# Patient Record
Sex: MALE | Race: OTHER | HISPANIC OR LATINO | Employment: STUDENT | URBAN - METROPOLITAN AREA
[De-identification: names, ages, dates, MRNs, and addresses within clinical notes are randomized per-mention and may not be internally consistent; named-entity substitution may affect disease eponyms.]

---
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
## 2023-01-27 ENCOUNTER — HOSPITAL ENCOUNTER (EMERGENCY)
Facility: HOSPITAL | Age: 18
Discharge: HOME | End: 2023-01-27
Attending: EMERGENCY MEDICINE

## 2023-01-27 ENCOUNTER — APPOINTMENT (EMERGENCY)
Dept: RADIOLOGY | Facility: HOSPITAL | Age: 18
End: 2023-01-27

## 2023-01-27 VITALS
DIASTOLIC BLOOD PRESSURE: 63 MMHG | RESPIRATION RATE: 17 BRPM | OXYGEN SATURATION: 96 % | TEMPERATURE: 97.9 F | WEIGHT: 237.88 LBS | HEART RATE: 96 BPM | SYSTOLIC BLOOD PRESSURE: 142 MMHG

## 2023-01-27 DIAGNOSIS — R04.0 EPISTAXIS: ICD-10-CM

## 2023-01-27 DIAGNOSIS — J11.1 INFLUENZA-LIKE ILLNESS: Primary | ICD-10-CM

## 2023-01-27 DIAGNOSIS — R11.2 NAUSEA AND VOMITING: ICD-10-CM

## 2023-01-27 LAB
ALBUMIN SERPL BCP-MCNC: 3.6 G/DL (ref 3.5–5)
ALP SERPL-CCNC: 88 U/L (ref 46–484)
ALT SERPL W P-5'-P-CCNC: 26 U/L (ref 12–78)
ANION GAP SERPL CALCULATED.3IONS-SCNC: 9 MMOL/L (ref 4–13)
AST SERPL W P-5'-P-CCNC: 21 U/L (ref 5–45)
BASOPHILS # BLD AUTO: 0.05 THOUSANDS/ÂΜL (ref 0–0.1)
BASOPHILS NFR BLD AUTO: 0 % (ref 0–1)
BILIRUB SERPL-MCNC: 0.24 MG/DL (ref 0.2–1)
BUN SERPL-MCNC: 10 MG/DL (ref 5–25)
CALCIUM SERPL-MCNC: 9.1 MG/DL (ref 8.3–10.1)
CHLORIDE SERPL-SCNC: 103 MMOL/L (ref 96–108)
CO2 SERPL-SCNC: 27 MMOL/L (ref 21–32)
CREAT SERPL-MCNC: 0.91 MG/DL (ref 0.6–1.3)
EOSINOPHIL # BLD AUTO: 0.54 THOUSAND/ÂΜL (ref 0–0.61)
EOSINOPHIL NFR BLD AUTO: 4 % (ref 0–6)
ERYTHROCYTE [DISTWIDTH] IN BLOOD BY AUTOMATED COUNT: 12.7 % (ref 11.6–15.1)
FLUAV RNA RESP QL NAA+PROBE: NEGATIVE
FLUBV RNA RESP QL NAA+PROBE: NEGATIVE
GFR SERPL CREATININE-BSD FRML MDRD: 121 ML/MIN/1.73SQ M
GLUCOSE SERPL-MCNC: 106 MG/DL (ref 65–140)
HCT VFR BLD AUTO: 44 % (ref 36.5–49.3)
HGB BLD-MCNC: 14.9 G/DL (ref 12–17)
IMM GRANULOCYTES # BLD AUTO: 0.06 THOUSAND/UL (ref 0–0.2)
IMM GRANULOCYTES NFR BLD AUTO: 0 % (ref 0–2)
LIPASE SERPL-CCNC: 76 U/L (ref 73–393)
LYMPHOCYTES # BLD AUTO: 3.18 THOUSANDS/ÂΜL (ref 0.6–4.47)
LYMPHOCYTES NFR BLD AUTO: 22 % (ref 14–44)
MCH RBC QN AUTO: 29.2 PG (ref 26.8–34.3)
MCHC RBC AUTO-ENTMCNC: 33.9 G/DL (ref 31.4–37.4)
MCV RBC AUTO: 86 FL (ref 82–98)
MONOCYTES # BLD AUTO: 1.07 THOUSAND/ÂΜL (ref 0.17–1.22)
MONOCYTES NFR BLD AUTO: 7 % (ref 4–12)
NEUTROPHILS # BLD AUTO: 9.77 THOUSANDS/ÂΜL (ref 1.85–7.62)
NEUTS SEG NFR BLD AUTO: 67 % (ref 43–75)
NRBC BLD AUTO-RTO: 0 /100 WBCS
PLATELET # BLD AUTO: 338 THOUSANDS/UL (ref 149–390)
PMV BLD AUTO: 9.2 FL (ref 8.9–12.7)
POTASSIUM SERPL-SCNC: 3.8 MMOL/L (ref 3.5–5.3)
PROT SERPL-MCNC: 7.5 G/DL (ref 6.4–8.4)
RBC # BLD AUTO: 5.11 MILLION/UL (ref 3.88–5.62)
RSV RNA RESP QL NAA+PROBE: NEGATIVE
SARS-COV-2 RNA RESP QL NAA+PROBE: NEGATIVE
SODIUM SERPL-SCNC: 139 MMOL/L (ref 135–147)
WBC # BLD AUTO: 14.67 THOUSAND/UL (ref 4.31–10.16)

## 2023-01-27 RX ORDER — FAMOTIDINE 20 MG/1
20 TABLET, FILM COATED ORAL 2 TIMES DAILY
Qty: 30 TABLET | Refills: 0 | Status: SHIPPED | OUTPATIENT
Start: 2023-01-27

## 2023-01-27 RX ORDER — ACETAMINOPHEN 325 MG/1
650 TABLET ORAL EVERY 6 HOURS PRN
Qty: 40 TABLET | Refills: 0 | Status: SHIPPED | OUTPATIENT
Start: 2023-01-27

## 2023-01-27 RX ORDER — ONDANSETRON 4 MG/1
4 TABLET, ORALLY DISINTEGRATING ORAL EVERY 6 HOURS PRN
Qty: 12 TABLET | Refills: 0 | Status: SHIPPED | OUTPATIENT
Start: 2023-01-27

## 2023-01-27 RX ORDER — ONDANSETRON 2 MG/ML
4 INJECTION INTRAMUSCULAR; INTRAVENOUS ONCE
Status: COMPLETED | OUTPATIENT
Start: 2023-01-27 | End: 2023-01-27

## 2023-01-27 RX ADMIN — ONDANSETRON 4 MG: 2 INJECTION INTRAMUSCULAR; INTRAVENOUS at 03:55

## 2023-01-27 NOTE — ED PROVIDER NOTES
Pt Name: Alexander Haddad  MRN: [[id number]]  [place] 2005  Age/Sex: 21 y o  male  Date of evaluation: 1/27/2023  PCP: Dung Rose MD    [address] [place] [address]    Chief Complaint   Patient presents with   • Vomiting Blood   • Hearing Loss     Pt reports an episode of vomiting blood approx 2 hours ago  C/o hearing loss bilaterally in car ride to facility  HPI    21 y o  male presenting with nausea vomiting hearing loss and nosebleed  Patient states that he was diagnosed with COVID 2 weeks ago, has been getting over that but over the past 3 days has had return of cough and cold symptoms including nasal congestion, rhinorrhea, and coughing  He states yesterday a small amount of nausea, today he had a nosebleed, with blood running down the back of his throat, after that nosebleed and about 2 hours ago suddenly became nauseous and had a single episode of emesis, states that he vomited up some blood  Patient states that while vomiting forcefully he also lost hearing in both ears, with his hearing feeling muffled and a feeling of pressure in both ears  He states that his hearing loss has gradually improved, now has a faint ringing in both ears but is able to hear well  He denies numbness, weakness, trauma, fevers, sick contacts, other symptoms      HPI      Past Medical and Surgical History    Past Medical History:   Diagnosis Date   • Anxiety    • Depressed        Past Surgical History:   Procedure Laterality Date   • WISDOM TOOTH EXTRACTION         Family History   Problem Relation Age of Onset   • No Known Problems Mother    • No Known Problems Father        Social History     Tobacco Use   • Smoking status: Never   • Smokeless tobacco: Never           Allergies    Allergies   Allergen Reactions   • Amoxicillin        Home Medications    Prior to Admission medications    Medication Sig Start Date End Date Taking?  Authorizing Provider   acetaminophen (TYLENOL) 500 mg tablet Take 2 tablets (1,000 mg total) by mouth every 6 (six) hours as needed for mild pain 6/23/18   Penelope Gupta MD   ibuprofen (MOTRIN) 400 mg tablet Take 1 tablet (400 mg total) by mouth every 6 (six) hours as needed for mild pain 6/23/18   Penelope Gupta MD           Review of Systems    Review of Systems   Constitutional: Negative for appetite change, chills and diaphoresis  HENT: Positive for congestion, ear pain, hearing loss and nosebleeds  Negative for drooling, facial swelling, trouble swallowing and voice change  Respiratory: Positive for cough  Negative for apnea, shortness of breath and wheezing  Cardiovascular: Negative for chest pain and leg swelling  Gastrointestinal: Positive for nausea and vomiting  Negative for abdominal distention, abdominal pain and diarrhea  Genitourinary: Negative for dysuria and urgency  Musculoskeletal: Negative for arthralgias, back pain, gait problem and neck pain  Skin: Negative for color change, rash and wound  Neurological: Negative for seizures, speech difficulty, weakness and headaches  Psychiatric/Behavioral: Negative for agitation, behavioral problems and dysphoric mood  The patient is not nervous/anxious  All other systems reviewed and negative  Physical Exam      ED Triage Vitals [01/27/23 0326]   Temperature Pulse Respirations Blood Pressure SpO2   97 9 °F (36 6 °C) 98 18 131/87 98 %      Temp Source Heart Rate Source Patient Position - Orthostatic VS BP Location FiO2 (%)   Tympanic Monitor Sitting Right arm --      Pain Score       4               Physical Exam  Vitals and nursing note reviewed  Constitutional:       General: He is not in acute distress  Appearance: He is well-developed  He is not ill-appearing, toxic-appearing or diaphoretic  HENT:      Head: Normocephalic and atraumatic        Right Ear: Ear canal and external ear normal       Left Ear: Ear canal and external ear normal       Ears:      Comments: Bilateral tympanic membranes erythematous but light reflex intact and no perforation noted  Nose: Congestion present  Comments: Nasal congestion noted, small dried blood noted at right nostril  Mouth/Throat:      Mouth: Mucous membranes are moist       Pharynx: Oropharynx is clear  Posterior oropharyngeal erythema present  No oropharyngeal exudate  Comments: Mild erythema the posterior pharynx, no swelling, airway widely patent, no bulge in the soft palate, no uvular deviation, phonating normally and handling secretions  Eyes:      Conjunctiva/sclera: Conjunctivae normal       Pupils: Pupils are equal, round, and reactive to light  Neck:      Trachea: No tracheal deviation  Cardiovascular:      Rate and Rhythm: Normal rate and regular rhythm  Pulses: Normal pulses  Heart sounds: Normal heart sounds  No murmur heard  Pulmonary:      Effort: Pulmonary effort is normal  No respiratory distress  Breath sounds: Normal breath sounds  No stridor  No wheezing, rhonchi or rales  Abdominal:      General: There is no distension  Palpations: Abdomen is soft  There is no mass  Tenderness: There is no abdominal tenderness  There is no guarding or rebound  Hernia: No hernia is present  Musculoskeletal:         General: No deformity  Normal range of motion  Cervical back: Normal range of motion and neck supple  No rigidity  Skin:     General: Skin is warm and dry  Capillary Refill: Capillary refill takes less than 2 seconds  Findings: No rash  Neurological:      Mental Status: He is alert and oriented to person, place, and time  Psychiatric:         Behavior: Behavior normal          Thought Content:  Thought content normal          Judgment: Judgment normal               Diagnostic Results      Labs:    Results Reviewed     Procedure Component Value Units Date/Time    FLU/RSV/COVID - if FLU/RSV clinically relevant [[id number]]  (Normal) Collected: 01/27/23 0355    Lab Status: Final result Specimen: Nares from Nose Updated: 01/27/23 0446     SARS-CoV-2 Negative     INFLUENZA A PCR Negative     INFLUENZA B PCR Negative     RSV PCR Negative    Narrative:      FOR PEDIATRIC PATIENTS - copy/paste COVID Guidelines URL to browser: [email or web address]    SARS-CoV-2 assay is a Nucleic Acid Amplification assay intended for the  qualitative detection of nucleic acid from SARS-CoV-2 in nasopharyngeal  swabs  Results are for the presumptive identification of SARS-CoV-2 RNA  Positive results are indicative of infection with SARS-CoV-2, the virus  causing COVID-19, but do not rule out bacterial infection or co-infection  with other viruses  Laboratories within the [place] and its  territories are required to report all positive results to the appropriate  public health authorities  Negative results do not preclude SARS-CoV-2  infection and should not be used as the sole basis for treatment or other  patient management decisions  Negative results must be combined with  clinical observations, patient history, and epidemiological information  This test has not been FDA cleared or approved  This test has been authorized by FDA under an Emergency Use Authorization  (EUA)  This test is only authorized for the duration of time the  declaration that circumstances exist justifying the authorization of the  emergency use of an in vitro diagnostic tests for detection of SARS-CoV-2  virus and/or diagnosis of COVID-19 infection under section 564(b)(1) of  the Act, 21 U  S C  049HLK-1(H)(3), unless the authorization is terminated  or revoked sooner  The test has been validated but independent review by FDA  and CLIA is pending  Test performed using [employer] GeneXpert: This RT-PCR assay targets N2,  a region unique to SARS-CoV-2  A conserved region in the E-gene was chosen  for pan-Sarbecovirus detection which includes SARS-CoV-2      According to CMS-2020-01-R, this platform meets the definition of high-throughput technology      Comprehensive metabolic panel [[id number]] Collected: 01/27/23 0355    Lab Status: Final result Specimen: Blood from Arm, Right Updated: 01/27/23 0427     Sodium 139 mmol/L      Potassium 3 8 mmol/L      Chloride 103 mmol/L      CO2 27 mmol/L      ANION GAP 9 mmol/L      BUN 10 mg/dL      Creatinine 0 91 mg/dL      Glucose 106 mg/dL      Calcium 9 1 mg/dL      AST 21 U/L      ALT 26 U/L      Alkaline Phosphatase 88 U/L      Total Protein 7 5 g/dL      Albumin 3 6 g/dL      Total Bilirubin 0 24 mg/dL      eGFR 121 ml/min/1 73sq m     Narrative:      National Kidney Disease Foundation guidelines for Chronic Kidney Disease (CKD):   •  Stage 1 with normal or high GFR (GFR > 90 mL/min/1 73 square meters)  •  Stage 2 Mild CKD (GFR = 60-89 mL/min/1 73 square meters)  •  Stage 3A Moderate CKD (GFR = 45-59 mL/min/1 73 square meters)  •  Stage 3B Moderate CKD (GFR = 30-44 mL/min/1 73 square meters)  •  Stage 4 Severe CKD (GFR = 15-29 mL/min/1 73 square meters)  •  Stage 5 End Stage CKD (GFR <15 mL/min/1 73 square meters)  Note: GFR calculation is accurate only with a steady state creatinine    Lipase [[id number]]  (Normal) Collected: 01/27/23 0355    Lab Status: Final result Specimen: Blood from Arm, Right Updated: 01/27/23 0427     Lipase 76 u/L     CBC and differential [[id number]]  (Abnormal) Collected: 01/27/23 0355    Lab Status: Final result Specimen: Blood from Arm, Right Updated: 01/27/23 0405     WBC 14 67 Thousand/uL      RBC 5 11 Million/uL      Hemoglobin 14 9 g/dL      Hematocrit 44 0 %      MCV 86 fL      MCH 29 2 pg      MCHC 33 9 g/dL      RDW 12 7 %      MPV 9 2 fL      Platelets 551 Thousands/uL      nRBC 0 /100 WBCs      Neutrophils Relative 67 %      Immat GRANS % 0 %      Lymphocytes Relative 22 %      Monocytes Relative 7 %      Eosinophils Relative 4 %      Basophils Relative 0 %      Neutrophils Absolute 9 77 Thousands/µL Immature Grans Absolute 0 06 Thousand/uL      Lymphocytes Absolute 3 18 Thousands/µL      Monocytes Absolute 1 07 Thousand/µL      Eosinophils Absolute 0 54 Thousand/µL      Basophils Absolute 0 05 Thousands/µL           All labs reviewed and utilized in the medical decision making process    Radiology:    XR chest 1 view portable   ED Interpretation   No acute cardiopulmonary process or osseous abnormality  All radiology studies independently viewed by me and interpreted by the radiologist     Procedure    Procedures        ED Course of Care and Re-Assessments      Given Zofran with resolution of nausea  Medications   ondansetron (ZOFRAN) injection 4 mg (4 mg Intravenous Given 1/27/23 0355)           FINAL IMPRESSION    Final diagnoses:   Influenza-like illness   Epistaxis   Nausea and vomiting         DISPOSITION/PLAN    Presentation as above with influenza-like illness as well as nausea and vomiting and epistaxis that resolved prior to arrival   Vital signs reassuring, examination remarkable findings as above  Overall, suspect this episode represents a viral illness, with epistaxis leading to some swallowed blood and reported episode of hematemesis due to vomiting of swallowed blood, Helga-Ren tear also considered as possible etiology of reported blood in emesis  No repeat episodes while in emergency department, no anemia on labs, no widened mediastinum or pneumomediastinum noted on chest x-ray  Low suspicion for bacterial l pneumonia, pneumothorax, sepsis, brisk upper GI bleed, or other acute life threat at this time  Hearing loss likely secondary to transient overpressure of the ears during vomiting episode, felt supported by erythematous tympanic membranes on exam, resolved without intervention, no evidence of tympanic membrane rupture or fluid behind ears at this time  Treated symptomatically, discharged with strict return precautions, follow-up with primary care doctor    Time reflects when diagnosis was documented in both MDM as applicable and the Disposition within this note     Time User Action Codes Description Comment    1/27/2023  5:03 AM Arabella Raid T Add [J11 1] Influenza-like illness     1/27/2023  5:03 AM Arabella Raid T Add [R04 0] Epistaxis     1/27/2023  5:03 AM Arabella Raid T Add [R11 2] Nausea and vomiting       ED Disposition     ED Disposition   Discharge    Condition   Stable    Date/Time   Fri Jan 27, 2023  5:03 AM    Comment   Jyoti Flavors discharge to home/self care                 Follow-up Information     Follow up With Specialties Details Why Contact Info Additional [address] [facility] Emergency Department Emergency Medicine Go to  If symptoms worsen [address] [facility] [address] [facility] Emergency Department, [address] [place] [address], One Stephany Albarado MD Family Medicine Call today To schedule close outpatient follow-up for this visit [address] [place] [address] [phone]               PATIENT REFERRED TO:    [address] [facility] Emergency Department  [address] [facility] [id number] [phone]  Go to   If symptoms worsen    Johnny Yip MD  [address] [facility] [address]  [phone]    Call today  To schedule close outpatient follow-up for this visit      DISCHARGE MEDICATIONS:    Discharge Medication List as of 1/27/2023  5:06 AM      START taking these medications    Details   !! acetaminophen (TYLENOL) 325 mg tablet Take 2 tablets (650 mg total) by mouth every 6 (six) hours as needed for mild pain, Starting Fri 1/27/2023, Print      famotidine (PEPCID) 20 mg tablet Take 1 tablet (20 mg total) by mouth 2 (two) times a day, Starting Fri 1/27/2023, Print      ondansetron (ZOFRAN-ODT) 4 mg disintegrating tablet Take 1 tablet (4 mg total) by mouth every 6 (six) hours as needed for nausea or vomiting, Starting Fri 1/27/2023, Print       !! - Potential duplicate medications found  Please discuss with provider  CONTINUE these medications which have NOT CHANGED    Details   !! acetaminophen (TYLENOL) 500 mg tablet Take 2 tablets (1,000 mg total) by mouth every 6 (six) hours as needed for mild pain, Starting Sat 6/23/2018, Print      ibuprofen (MOTRIN) 400 mg tablet Take 1 tablet (400 mg total) by mouth every 6 (six) hours as needed for mild pain, Starting Sat 6/23/2018, Print       !! - Potential duplicate medications found  Please discuss with provider  No discharge procedures on file  Sudarshan Danielle MD    Portions of the record may have been created with voice recognition software  Occasional wrong word or "sound alike" substitutions may have occurred due to the inherent limitations of voice recognition software    Please read the chart carefully and recognize, using context, where substitutions have occurred     Sudarshan Danielle MD  01/27/23 8957

## 2023-01-27 NOTE — Clinical Note
Lilialygilbert Nikki was seen and treated in our emergency department on 1/27/2023  Diagnosis: Influenza like illness    Kirk Clement  may return to work on return date  He may return on this date: 01/31/2023         If you have any questions or concerns, please don't hesitate to call        Keeley De Jesus MD    ______________________________           _______________          _______________  Hospital Representative                              Date                                Time

## 2024-07-22 NOTE — ED PROVIDER NOTES
Dr Allen notified of pt .    History  Chief Complaint   Patient presents with    Suicidal     Per pt, "I don't want to talk about it " Per mother, "He's suicidal " States he has been yelling, screaming, and crying for the past 20 minutes, and 2 days ago, he has been feeling uneasy  HPI patient mom reports a behavioral outburst tonight, patient is 67-year-old male, apparently he began screaming and crying tonight  Apparently mom reports last 2 days he has been somewhat depressed and things seem to be bothering him  Patient sees a psychiatrist twice a week  Patient is not currently in school due to stressors  Patient denies any direct suicidal ideation  He reports that he became upset tonight and acted out  Past medical history of anxiety, sees a therapist twice a week  Family history noncontributory  Social history nonsmoker no history of drug abuse  Prior to Admission Medications   Prescriptions Last Dose Informant Patient Reported? Taking?   acetaminophen (TYLENOL) 500 mg tablet   No No   Sig: Take 2 tablets (1,000 mg total) by mouth every 6 (six) hours as needed for mild pain   ibuprofen (MOTRIN) 400 mg tablet   No No   Sig: Take 1 tablet (400 mg total) by mouth every 6 (six) hours as needed for mild pain      Facility-Administered Medications: None       History reviewed  No pertinent past medical history  Past Surgical History:   Procedure Laterality Date    WISDOM TOOTH EXTRACTION         Family History   Problem Relation Age of Onset    No Known Problems Mother     No Known Problems Father      I have reviewed and agree with the history as documented  Social History   Substance Use Topics    Smoking status: Never Smoker    Smokeless tobacco: Never Used    Alcohol use Not on file        Review of Systems   Constitutional: Negative for fever  HENT: Negative for congestion  Eyes: Negative for pain and redness  Respiratory: Negative for cough and shortness of breath      Cardiovascular: Negative for chest pain    Gastrointestinal: Negative for abdominal pain and vomiting  Psychiatric/Behavioral: Negative for suicidal ideas  The patient is nervous/anxious  Physical Exam  Physical Exam   Constitutional: He is oriented to person, place, and time  He appears well-developed and well-nourished  HENT:   Head: Normocephalic  Right Ear: External ear normal    Left Ear: External ear normal    Nose: Nose normal    Mouth/Throat: Oropharynx is clear and moist    Eyes: Pupils are equal, round, and reactive to light  EOM and lids are normal    Neck: Normal range of motion  Neck supple  Cardiovascular: Normal rate, regular rhythm, normal heart sounds and intact distal pulses  Pulmonary/Chest: Effort normal and breath sounds normal  No respiratory distress  Abdominal: Soft  Musculoskeletal: Normal range of motion  He exhibits no deformity  Neurological: He is alert and oriented to person, place, and time  Skin: Skin is warm and dry  Psychiatric: He has a normal mood and affect  Linear thinking, upset at times, denies suicidal ideation   Nursing note and vitals reviewed        Vital Signs  ED Triage Vitals [11/08/18 1951]   Temperature Pulse Respirations Blood Pressure SpO2   98 3 °F (36 8 °C) 89 17 (!) 150/67 96 %      Temp src Heart Rate Source Patient Position - Orthostatic VS BP Location FiO2 (%)   Oral Monitor Sitting Left arm --      Pain Score       No Pain           Vitals:    11/08/18 1951 11/08/18 2241   BP: (!) 150/67 (!) 118/81   Pulse: 89 74   Patient Position - Orthostatic VS: Sitting Sitting       Visual Acuity      ED Medications  Medications - No data to display    Diagnostic Studies  Results Reviewed     Procedure Component Value Units Date/Time    Ethanol [[id number]]  (Normal) Collected:  11/08/18 2102    Lab Status:  Final result Specimen:  Blood from Arm, Right Updated:  11/08/18 2134     Ethanol Lvl <3 mg/dL     Rapid drug screen, urine [[id number]]  (Normal) Collected:  11/08/18 2112 Lab Status:  Final result Specimen:  Urine from Urine, Clean Catch Updated:  11/08/18 2127     Amph/Meth UR Negative     Barbiturate Ur Negative     Benzodiazepine Urine Negative     Cocaine Urine Negative     Methadone Urine Negative     Opiate Urine Negative     PCP Ur Negative     THC Urine Negative    Narrative:         FOR MEDICAL PURPOSES ONLY  IF CONFIRMATION NEEDED PLEASE CONTACT THE LAB WITHIN 5 DAYS  Drug Screen Cutoff Levels:  AMPHETAMINE/METHAMPHETAMINES  1000 ng/mL  BARBITURATES     200 ng/mL  BENZODIAZEPINES     200 ng/mL  COCAINE      300 ng/mL  METHADONE      300 ng/mL  OPIATES      300 ng/mL  PHENCYCLIDINE     25 ng/mL  THC       50 ng/mL                 No orders to display              Procedures  Procedures       Phone Contacts  ED Phone Contact    ED Course      seen with crisis worker, history of anxiety now with some depression, denies suicidal ideation  discussed with the crisis worker         no sign of intoxication or drug use from his tox screen              MDM medical decision making 69-year-old male comes into the emergency department with his mother, apparently became upset tonight and had an emotional outburst, there is no sign of injury  No medical problems at this time  Patient not suicidal   Discussed with the crisis worker will follow up as an outpatient  Presentation consistent with anxiety and depression  CritCare Time    Disposition  Final diagnoses:   Depression     Time reflects when diagnosis was documented in both MDM as applicable and the Disposition within this note     Time User Action Codes Description Comment    11/8/2018 10:43 PM Lisa Min Add [F32 9] Depression       ED Disposition     ED Disposition Condition Comment    Discharge  Sudarshan Jones discharge to home/self care      Condition at discharge: Good        Follow-up Information    None         Discharge Medication List as of 11/8/2018 10:43 PM      CONTINUE these medications which have NOT CHANGED    Details   acetaminophen (TYLENOL) 500 mg tablet Take 2 tablets (1,000 mg total) by mouth every 6 (six) hours as needed for mild pain, Starting Sat 6/23/2018, Print      ibuprofen (MOTRIN) 400 mg tablet Take 1 tablet (400 mg total) by mouth every 6 (six) hours as needed for mild pain, Starting Sat 6/23/2018, Print           No discharge procedures on file      ED Provider  Electronically Signed by           Adryan Shane MD  11/08/18 8321